# Patient Record
Sex: FEMALE | Race: WHITE | NOT HISPANIC OR LATINO | Employment: FULL TIME | URBAN - METROPOLITAN AREA
[De-identification: names, ages, dates, MRNs, and addresses within clinical notes are randomized per-mention and may not be internally consistent; named-entity substitution may affect disease eponyms.]

---

## 2023-01-14 ENCOUNTER — HOSPITAL ENCOUNTER (EMERGENCY)
Facility: HOSPITAL | Age: 28
Discharge: HOME | End: 2023-01-15
Attending: EMERGENCY MEDICINE
Payer: COMMERCIAL

## 2023-01-14 VITALS
DIASTOLIC BLOOD PRESSURE: 63 MMHG | SYSTOLIC BLOOD PRESSURE: 112 MMHG | RESPIRATION RATE: 16 BRPM | HEART RATE: 83 BPM | OXYGEN SATURATION: 100 % | TEMPERATURE: 99 F
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
## 2023-01-14 PROCEDURE — 99284 EMERGENCY DEPT VISIT MOD MDM: CPT | Mod: 25

## 2023-01-14 PROCEDURE — 99284 PR EMERGENCY DEPT VISIT,LEVEL IV: ICD-10-PCS | Mod: ,,, | Performed by: EMERGENCY MEDICINE

## 2023-01-14 PROCEDURE — 99284 EMERGENCY DEPT VISIT MOD MDM: CPT | Mod: ,,, | Performed by: EMERGENCY MEDICINE

## 2023-01-14 NOTE — Clinical Note
"Sofiya"Chrissie Lantigua was seen and treated in our emergency department on 1/14/2023.  She may return to work on 01/16/2023.       If you have any questions or concerns, please don't hesitate to call.      Frederic Dumont MD"

## 2023-01-15 PROCEDURE — 90715 TDAP VACCINE 7 YRS/> IM: CPT | Performed by: EMERGENCY MEDICINE

## 2023-01-15 PROCEDURE — 90471 IMMUNIZATION ADMIN: CPT | Performed by: EMERGENCY MEDICINE

## 2023-01-15 PROCEDURE — 25000003 PHARM REV CODE 250: Performed by: EMERGENCY MEDICINE

## 2023-01-15 PROCEDURE — 63600175 PHARM REV CODE 636 W HCPCS: Performed by: EMERGENCY MEDICINE

## 2023-01-15 RX ORDER — BACITRACIN ZINC 500 UNIT/G
OINTMENT (GRAM) TOPICAL 2 TIMES DAILY
Qty: 30 G | Refills: 0 | Status: SHIPPED | OUTPATIENT
Start: 2023-01-15 | End: 2023-01-22

## 2023-01-15 RX ORDER — AMOXICILLIN AND CLAVULANATE POTASSIUM 875; 125 MG/1; MG/1
1 TABLET, FILM COATED ORAL 2 TIMES DAILY
Qty: 14 TABLET | Refills: 0 | Status: SHIPPED | OUTPATIENT
Start: 2023-01-15 | End: 2023-01-22

## 2023-01-15 RX ORDER — AMOXICILLIN AND CLAVULANATE POTASSIUM 875; 125 MG/1; MG/1
1 TABLET, FILM COATED ORAL
Status: COMPLETED | OUTPATIENT
Start: 2023-01-15 | End: 2023-01-15

## 2023-01-15 RX ADMIN — TETANUS TOXOID, REDUCED DIPHTHERIA TOXOID AND ACELLULAR PERTUSSIS VACCINE, ADSORBED 0.5 ML: 5; 2.5; 8; 8; 2.5 SUSPENSION INTRAMUSCULAR at 12:01

## 2023-01-15 RX ADMIN — AMOXICILLIN AND CLAVULANATE POTASSIUM 1 TABLET: 875; 125 TABLET, FILM COATED ORAL at 12:01

## 2023-01-15 NOTE — ED PROVIDER NOTES
Encounter Date: 1/14/2023       History     Chief Complaint   Patient presents with    Animal Bite     Pt was bite by a dog at her work. Dog was vaccinated per pt report. Pt is unaware of last tetanus shot. Two small cuts noted to pt's left pinky finger.      26 yo female presenting for evaluation of a dog bite.    PMH:  Denies chronic medical conditions, no allergies  Unknown last tetanus     Context:  The patient works in a veterinary clinic.  She was helping a chocolate lab in respiratory distress and it bit her left pinky finger.  The patient is right-hand dominant.  She denies numbness to the finger.  She immediately applied ice and took a Tylenol.  Owner of the dog reports vaccinations are up-to-date.  Onset:  Acute  Location:  Left 5th digit  Duration:  Constant since onset just prior to arrival         The history is provided by the patient and medical records. No  was used.   Review of patient's allergies indicates:  No Known Allergies  No past medical history on file.  No past surgical history on file.  No family history on file.     Review of Systems   Skin:  Positive for wound.   Allergic/Immunologic: Negative for immunocompromised state.   Neurological:  Negative for numbness.   Hematological:  Does not bruise/bleed easily.     Physical Exam     Initial Vitals [01/14/23 2326]   BP Pulse Resp Temp SpO2   112/63 83 16 98.6 °F (37 °C) 100 %      MAP       --         Physical Exam    Nursing note and vitals reviewed.  Constitutional: She is not diaphoretic. No distress.   HENT:   Head: Normocephalic and atraumatic.   Eyes: Right eye exhibits no discharge. Left eye exhibits no discharge.   Neck: Neck supple. No tracheal deviation present.   Cardiovascular:  Normal rate, regular rhythm and intact distal pulses.           Pulmonary/Chest: No respiratory distress.   Musculoskeletal:      Cervical back: Neck supple.      Comments: Left hand:  Intact radial pulse  5th digit with superficial  lacerations medially and laterally, not gaping, depth of wounds explored - no tendon involvement  FROM each joint of 5th digit, SILT     Neurological: She is alert and oriented to person, place, and time.   Skin: Skin is warm. No rash noted.   Psychiatric: She has a normal mood and affect. Her behavior is normal.       ED Course   Procedures  Labs Reviewed   HIV 1 / 2 ANTIBODY   HEPATITIS C ANTIBODY          Imaging Results              X-Ray Finger 2 or More Views Left (Final result)  Result time 01/15/23 02:01:16      Final result by Raciel Davila MD (01/15/23 02:01:16)                   Impression:      No radiographic evidence of acute displaced fracture or retained radiopaque foreign body within the visualized fingers of the left hand.      Electronically signed by: Raciel Davila MD  Date:    01/15/2023  Time:    02:01               Narrative:    EXAMINATION:  XR FINGER 2 OR MORE VIEWS LEFT    CLINICAL HISTORY:  dog bite, r/o foreign body and fracture;    TECHNIQUE:  AP, oblique, lateral views of the left fingers    COMPARISON:  None    FINDINGS:  There is no radiographic evidence of acute displaced fracture.  Alignment appears within normal limits signs of dislocation.  Joint spaces appear appropriately maintained.  No retained radiopaque foreign body appreciated within the visualized soft tissues.                                       Medications   Tdap (BOOSTRIX) vaccine injection 0.5 mL (0.5 mLs Intramuscular Given 1/15/23 0055)   amoxicillin-clavulanate 875-125mg per tablet 1 tablet (1 tablet Oral Given 1/15/23 0056)     Medical Decision Making:   History:   Old Medical Records: I decided to obtain old medical records.  Initial Assessment:   Emergent evaluation of a 19-year-old female presenting for a dog bite to her left 5th digit.  Digit in hand are neurovascularly intact.  We discussed the risks and benefits of rabies series, but both agree that the incident was provoked and given the dog is  vaccinated, low risk.  Will forego rabies.  Tetanus updated.  Wounds irrigated thoroughly.  Differential Diagnosis:   Including, but not limited to:    Laceration  Encounter for tetanus update  Dog bite  Fracture  Foreign body   Independently Interpreted Test(s):   I have ordered and independently interpreted X-rays - see summary below.       <> Summary of X-Ray Reading(s): No fracture foreign body  Clinical Tests:   Radiological Study: Ordered and Reviewed  ED Management:      Advised wound check in 2 days.  Return immediately for evaluation if erythema, edema, difficulty bending digit.   No e/o flexor tenosynovitis at this time.   We discussed daily dressing change and antibacterial soap cleansing, antibacterial ointment, complete full course of prophylactic antibiotics.   All questions answered prior to discharge.  Patient understands and agrees the plan.  Return precautions given. if            ED Course as of 01/15/23 0219   Sun Loy 15, 2023   0136 Temp: 98.6 °F (37 °C)  Afebrile [AB]      ED Course User Index  [AB] Frederic Dumont MD                 Clinical Impression:   Final diagnoses:  [W54.0XXA] Dog bite, initial encounter (Primary)        ED Disposition Condition    Discharge Stable          ED Prescriptions       Medication Sig Dispense Start Date End Date Auth. Provider    amoxicillin-clavulanate 875-125mg (AUGMENTIN) 875-125 mg per tablet Take 1 tablet by mouth 2 (two) times daily. for 7 days 14 tablet 1/15/2023 1/22/2023 Frederic Dumont MD    bacitracin 500 unit/gram Oint Apply topically 2 (two) times daily. for 7 days 30 g 1/15/2023 1/22/2023 Frederic Dumont MD          Follow-up Information       Follow up With Specialties Details Why Contact Info Additional Information    [facility] Emergency Medicine  As needed [address]  [facility] [address] [phone]     [facility] Internal Medicine In 2 days For wound re-check [address]  [facility] [address]  [phone] [facility] Please park in surface lot and check in at central registration desk             Frederic Dumont MD  01/15/23 0219       Frederic Dumont MD  01/15/23 8204

## 2023-01-15 NOTE — ED TRIAGE NOTES
Sofiya Lantigua, a 27 y.o. female presents to the ED w/ complaint of dog bite to L pinky finger from dog at work. Small lac and swelling noted to finger. Dog UTD on all vaccinations. Unsure of last tetanus. Took 1000mg Tylenol at 2230    Triage note:  Chief Complaint   Patient presents with    Animal Bite     Pt was bite by a dog at her work. Dog was vaccinated per pt report. Pt is unaware of last tetanus shot. Two small cuts noted to pt's left pinky finger.      Review of patient's allergies indicates:  No Known Allergies  No past medical history on file.

## 2023-10-31 ENCOUNTER — HOSPITAL ENCOUNTER (EMERGENCY)
Facility: HOSPITAL | Age: 28
Discharge: HOME | End: 2023-11-01
Attending: EMERGENCY MEDICINE
Payer: COMMERCIAL
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
## 2023-10-31 DIAGNOSIS — S61.451A DOG BITE OF RIGHT HAND, INITIAL ENCOUNTER: Primary | ICD-10-CM

## 2023-10-31 LAB
B-HCG UR QL: NEGATIVE
CTP QC/QA: YES

## 2023-10-31 PROCEDURE — 25000003 PHARM REV CODE 250

## 2023-10-31 PROCEDURE — 99283 EMERGENCY DEPT VISIT LOW MDM: CPT

## 2023-10-31 PROCEDURE — 81025 URINE PREGNANCY TEST: CPT

## 2023-10-31 RX ORDER — ACETAMINOPHEN 500 MG
1000 TABLET ORAL
Status: COMPLETED | OUTPATIENT
Start: 2023-10-31 | End: 2023-10-31

## 2023-10-31 RX ORDER — AMOXICILLIN AND CLAVULANATE POTASSIUM 875; 125 MG/1; MG/1
1 TABLET, FILM COATED ORAL 2 TIMES DAILY
Qty: 14 TABLET | Refills: 0 | Status: SHIPPED | OUTPATIENT
Start: 2023-10-31

## 2023-10-31 RX ORDER — IBUPROFEN 400 MG/1
800 TABLET ORAL
Status: COMPLETED | OUTPATIENT
Start: 2023-10-31 | End: 2023-10-31

## 2023-10-31 RX ADMIN — IBUPROFEN 800 MG: 400 TABLET, FILM COATED ORAL at 11:10

## 2023-10-31 RX ADMIN — ACETAMINOPHEN 1000 MG: 500 TABLET ORAL at 11:10

## 2023-11-01 VITALS
HEART RATE: 84 BPM | SYSTOLIC BLOOD PRESSURE: 124 MMHG | RESPIRATION RATE: 16 BRPM | TEMPERATURE: 98 F | OXYGEN SATURATION: 98 % | DIASTOLIC BLOOD PRESSURE: 66 MMHG

## 2023-11-01 NOTE — ED PROVIDER NOTES
Encounter Date: 10/31/2023       History     Chief Complaint   Patient presents with    Animal Bite     Pt comes to the ED with complaint due to a dog bite to the right hand 20 minutes PTA.  No shot records for the animal.         28-year-old female with no significant medical history presents to the ED regarding dog bite to right hand today.  Patient states she is a . States they were working on a sedated husky when it suddenly awakened and bit her right hand. Rates pain 10/10. Unknown vaccination status currently but the vet is figuring that out. Denies any wrist pain or other wounds. States she has been bitten in the past and received immunoglobulin but not the vaccination. Thoroughly irrigated with saline and chlorhexidine prior to arrival.    The history is provided by the patient and medical records.     Review of patient's allergies indicates:  No Known Allergies  History reviewed. No pertinent past medical history.  History reviewed. No pertinent surgical history.  History reviewed. No pertinent family history.  Social History     Tobacco Use    Smoking status: Unknown     Review of Systems   Constitutional:  Negative for fever.   HENT:  Negative for sore throat.    Respiratory:  Negative for shortness of breath.    Cardiovascular:  Negative for chest pain.   Gastrointestinal:  Negative for nausea.   Genitourinary:  Negative for dysuria.   Musculoskeletal:  Negative for back pain.   Skin:  Positive for wound. Negative for rash.   Neurological:  Negative for weakness.   Hematological:  Does not bruise/bleed easily.       Physical Exam     Initial Vitals [10/31/23 2229]   BP Pulse Resp Temp SpO2   130/63 103 20 97.6 °F (36.4 °C) 100 %      MAP       --         Physical Exam    Vitals reviewed.  Constitutional: She appears well-developed and well-nourished. She is not diaphoretic. No distress.   Tearful   HENT:   Head: Normocephalic and atraumatic.   Cardiovascular:  Normal rate and intact distal  pulses.           Pulmonary/Chest: No respiratory distress.   Musculoskeletal:      Right wrist: No swelling, lacerations, tenderness or bony tenderness. Normal range of motion. Normal pulse.      Right hand: Laceration and tenderness present. Normal range of motion. Normal strength. Normal sensation. Normal capillary refill.      Comments: 0.5 cm to right dorsum hand and puncture would to volar aspect. TTP but no decreased ROM     Neurological: She is alert and oriented to person, place, and time. She has normal strength. No sensory deficit.   Psychiatric: She has a normal mood and affect.               ED Course   Procedures  Labs Reviewed   POCT URINE PREGNANCY          Imaging Results              X-Ray Hand 3 View Right (Final result)  Result time 10/31/23 23:47:18      Final result by Trav Cooley MD (10/31/23 23:47:18)                   Impression:      No acute fracture.      Electronically signed by: Trav Cooley MD  Date:    10/31/2023  Time:    23:47               Narrative:    EXAMINATION:  XR HAND COMPLETE 3 VIEW RIGHT    CLINICAL HISTORY:  dog bite;    TECHNIQUE:  PA, lateral, and oblique views of the right hand were performed.    COMPARISON:  None    FINDINGS:  No fracture or dislocation.  Unremarkable visualized bony structures.      Soft tissues are unremarkable.                                    X-Rays:   Independently Interpreted Readings:   Other Readings:  No fracture or dislocation. No foreign body    Medications   ibuprofen tablet 800 mg (800 mg Oral Given 10/31/23 2302)   acetaminophen tablet 1,000 mg (1,000 mg Oral Given 10/31/23 2302)     Medical Decision Making  Amount and/or Complexity of Data Reviewed  Labs: ordered. Decision-making details documented in ED Course.  Radiology: ordered. Decision-making details documented in ED Course.    Risk  OTC drugs.  Prescription drug management.         APC / Resident Notes:   28-year-old female with no significant medical history  presents to the ED regarding dog bite to right hand today. Vitals WNL. Right hand neurovascularly intact.  Tetanus up-to-date.  Laceration superficial, do not think repair is needed. Will get x-ray to assess for fracture.    My differential diagnoses include but are not limited to:   Laceration, puncture wound, abrasion, fracture, dislocation, contusion, foreign body    See ED course.  Irrigated wounds with 500 cc of saline.  Educated patient on findings. Educated on risks of rabies if patient is unable to figure out vaccination status. Rx'd Augmentin. Advised to follow up with PCP and strict ED return precautions given with all questions answered.  Patient verbalized understanding and agreed to plan. Vitals are stable and safe for discharge.   I have reviewed the patient's records and discussed with my supervising physician.        ED Course as of 10/31/23 2352   Tue Oct 31, 2023   2246 BP: 130/63 [KB]   2246 Temp: 97.6 °F (36.4 °C) [KB]   2246 Resp: 20 [KB]   2246 SpO2: 100 % [KB]   2313 Preg Test, Ur: Negative [KB]   2349 X-Ray Hand 3 View Right  Impression:     No acute fracture. [KB]      ED Course User Index  [KB] Aletha Sung PA-C                    Clinical Impression:   Final diagnoses:  [S61.451A, W54.0XXA] Dog bite of right hand, initial encounter (Primary)        ED Disposition Condition    Discharge Stable          ED Prescriptions       Medication Sig Dispense Start Date End Date Auth. Provider    amoxicillin-clavulanate 875-125mg (AUGMENTIN) 875-125 mg per tablet Take 1 tablet by mouth 2 (two) times daily. 14 tablet 10/31/2023 -- Aletha Sung PA-C          Follow-up Information       Follow up With Specialties Details Why Contact Info    [facility] Emergency Medicine Go to  If symptoms worsen [address]  [facility] [address] [phone]             Aletha Sung PA-C  10/31/23 2352

## 2023-11-01 NOTE — ED TRIAGE NOTES
Sofiya Lantigua, a 28 y.o. female presents to the ED w/ complaint of animal bite. Reports dog bite to right hand about 20 minutes ago. Unknown of shot record for the animal    Adult Physical Assessment  LOC: Sofiya Lantigua, 28 y.o. female verified via two identifiers.  The patient is awake, alert, oriented and speaking appropriately at this time.  APPEARANCE: Patient resting comfortably and appears to be in no acute distress at this time. Patient is clean and well groomed, patient's clothing is properly fastened.  SKIN:The skin is warm and dry, color consistent with ethnicity, patient has normal skin turgor and moist mucus membranes. Dog bite noted to right hand, bleeding controlled  MUSCULOSKELETAL: Patient moving all extremities well, no obvious swelling or deformities noted.  RESPIRATORY: Airway is open and patent, respirations are spontaneous, patient has a normal effort and rate, no accessory muscle use noted.  CARDIAC: Patient has a normal rate and rhythm, no periphreal edema noted in any extremity, capillary refill < 3 seconds in all extremities  ABDOMEN: Soft and non tender to palpation, no abdominal distention noted. Bowel sounds present in all four quadrants.  NEUROLOGIC: Eyes open spontaneously, behavior appropriate to situation, follows commands, facial expression symmetrical, bilateral hand grasp equal and even, purposeful motor response noted, normal sensation in all extremities when touched with a finger.    Triage note:  Chief Complaint   Patient presents with    Animal Bite     Pt comes to the ED with complaint due to a dog bite to the right hand 20 minutes PTA.  No shot records for the animal.         Review of patient's allergies indicates:  No Known Allergies  No past medical history on file.